# Patient Record
Sex: MALE | URBAN - METROPOLITAN AREA
[De-identification: names, ages, dates, MRNs, and addresses within clinical notes are randomized per-mention and may not be internally consistent; named-entity substitution may affect disease eponyms.]

---

## 2022-10-06 ENCOUNTER — OFFICE VISIT (OUTPATIENT)
Dept: URBAN - METROPOLITAN AREA CLINIC 82 | Facility: CLINIC | Age: 57
End: 2022-10-06

## 2022-11-10 ENCOUNTER — OFFICE VISIT (OUTPATIENT)
Dept: URBAN - METROPOLITAN AREA CLINIC 82 | Facility: CLINIC | Age: 57
End: 2022-11-10
Payer: COMMERCIAL

## 2022-11-10 ENCOUNTER — WEB ENCOUNTER (OUTPATIENT)
Dept: URBAN - METROPOLITAN AREA CLINIC 82 | Facility: CLINIC | Age: 57
End: 2022-11-10

## 2022-11-10 ENCOUNTER — DASHBOARD ENCOUNTERS (OUTPATIENT)
Age: 57
End: 2022-11-10

## 2022-11-10 VITALS
WEIGHT: 189 LBS | SYSTOLIC BLOOD PRESSURE: 138 MMHG | HEART RATE: 82 BPM | RESPIRATION RATE: 20 BRPM | DIASTOLIC BLOOD PRESSURE: 76 MMHG | HEIGHT: 65 IN | TEMPERATURE: 97.2 F | BODY MASS INDEX: 31.49 KG/M2

## 2022-11-10 DIAGNOSIS — R10.32 LLQ ABDOMINAL PAIN: ICD-10-CM

## 2022-11-10 PROCEDURE — 99244 OFF/OP CNSLTJ NEW/EST MOD 40: CPT | Performed by: INTERNAL MEDICINE

## 2022-11-10 RX ORDER — DICYCLOMINE HYDROCHLORIDE 20 MG/1
1 TABLET TABLET ORAL
Qty: 120 | Refills: 3 | OUTPATIENT
Start: 2022-11-10 | End: 2023-03-10

## 2022-11-10 NOTE — HPI-TODAY'S VISIT:
This patient was referred by Dr. Mg Partida for an evaluation of colonoscopy.  A copy of this will be sent to the referring provider.  The patient has never had a colonoscopy before.  He does complain of some intermittent LLQ abdominal pain.  No blood in the stool.  No family hx of colon cancer.  He even went to the ER and got cross sectional imaging and blood work that were both normal.

## 2022-11-10 NOTE — PHYSICAL EXAM CHEST:
no lesions,  no deformities,  no traumatic injuries,  no significant scars are present,  chest wall non-tender,  no masses present, breathing is unlabored without accessory muscle use,normal breath sounds
21.4

## 2022-12-15 ENCOUNTER — OFFICE VISIT (OUTPATIENT)
Dept: URBAN - METROPOLITAN AREA SURGERY CENTER 13 | Facility: SURGERY CENTER | Age: 57
End: 2022-12-15

## 2023-01-17 ENCOUNTER — OFFICE VISIT (OUTPATIENT)
Dept: URBAN - METROPOLITAN AREA SURGERY CENTER 13 | Facility: SURGERY CENTER | Age: 58
End: 2023-01-17